# Patient Record
Sex: FEMALE | Race: OTHER
[De-identification: names, ages, dates, MRNs, and addresses within clinical notes are randomized per-mention and may not be internally consistent; named-entity substitution may affect disease eponyms.]

---

## 2021-09-09 PROBLEM — Z00.00 ENCOUNTER FOR PREVENTIVE HEALTH EXAMINATION: Status: ACTIVE | Noted: 2021-09-09

## 2021-09-10 ENCOUNTER — NON-APPOINTMENT (OUTPATIENT)
Age: 31
End: 2021-09-10

## 2021-09-24 ENCOUNTER — APPOINTMENT (OUTPATIENT)
Dept: GYNECOLOGIC ONCOLOGY | Facility: CLINIC | Age: 31
End: 2021-09-24
Payer: COMMERCIAL

## 2021-09-24 ENCOUNTER — NON-APPOINTMENT (OUTPATIENT)
Age: 31
End: 2021-09-24

## 2021-09-24 VITALS
HEIGHT: 66 IN | WEIGHT: 240 LBS | OXYGEN SATURATION: 98 % | BODY MASS INDEX: 38.57 KG/M2 | TEMPERATURE: 97.3 F | HEART RATE: 76 BPM | SYSTOLIC BLOOD PRESSURE: 129 MMHG | DIASTOLIC BLOOD PRESSURE: 90 MMHG

## 2021-09-24 DIAGNOSIS — Z01.818 ENCOUNTER FOR OTHER PREPROCEDURAL EXAMINATION: ICD-10-CM

## 2021-09-24 PROCEDURE — 99205 OFFICE O/P NEW HI 60 MIN: CPT

## 2021-09-24 NOTE — HISTORY OF PRESENT ILLNESS
[FreeTextEntry1] : Problem List\par 1. Adnexal Mass\par \par Previous Therapy \par 1. CT A/P 7/24/21\par     a) large 19.3cm pelvic mass, most likely arising from the right ovary, with fat component compatible with a dermoid. \par     b) Unremarkable noncontrast evaluation of the kidneys, ureters and urinary bladder\par 2. MRI Pelvis 9/7/21\par     a) uterus- there is no evidence of focal uterine mass or endometrial collection.\par     b) There is a large mass in the right ovary measuring 20cm. This mass is mixed solid and cystic with a 9.2cm fat containing component. The cystic component has increased when compared to the CT scan , currently measuring 12.6 x 9.8cm, previously 0.4 x 11.1cm. There are internal septations within the cystic component and irregular wall thickeneing. The possibility of an immature teratoma should be excluded. \par     c) The left ovary is normal

## 2021-09-24 NOTE — PHYSICAL EXAM
[Abnormal] : Adnexa(ae): Abnormal [Normal] : Recto-Vaginal Exam: Normal [de-identified] : mass appreciated to 6cm above the umbilicus in the RUQ [de-identified] : deviated posteriorly and to the left [de-identified] : large cystic mass arising from the righ ovary and extending to the RUQ, limited mobility, nontender, no solid nodules appreciated.

## 2021-09-24 NOTE — REVIEW OF SYSTEMS
[Negative] : Musculoskeletal [Vaginal Discharge] : no vaginal discharge [Abn Vag Bleeding] : no abnormal vaginal bleeding

## 2021-09-24 NOTE — CHIEF COMPLAINT
[FreeTextEntry1] : 32 y/o referred from Dr. Devine for further evaluation of large right adnexal mass. Patient reports pelvic pain, back pain and urinary frequency.\par \par LMP: 9/5/21\par \par OBHX: P0\par GYNHX: as above\par \par PMHX: None\par SX: ankle surgery\par \par MED: none\par ALL: NKDA\par \par SOCIAL: single, social ETOH, no other toxic habits\par FAM: No cancers

## 2021-09-24 NOTE — ASSESSMENT
[FreeTextEntry1] : I discussed with the patient with the aid of diagrams, reviewed the findings on history and physical examination, and reviewed the imaging studies in detail.  We reviewed the cystic and solid component of the mass, the CA-125, and other tumor markers. \par \par We discussed that this could be ovarian cancer; however, benign and low malignant potential tumors are also in the differential diagnosis. Other etiologies of pelvic masses, such as metastatic disease from another organ site also discussed.\par \par Again, with the aid of diagrams the different surgical approaches discussed including minimally invasive and open approaches.\par \par At this point, I recommend exploratory laparotomy and right salpingo-oophorectomy.  If there is evidence of malignancy, I recommend omentectomy, possible para-aortic nodes and pelvic node dissection. We discussed the role of fertility sparing surgery. In cases where fertility sparing surgery is performed, it is possible that a staged procedure will be necessary to come back and remove the remaining ovary and uterus. Patient understands this, and would like to proceed with fertility sparing surgery. Oncofertility consultation with KAYA discussed. Patient will consider this in the future, but is not interested in seeing anyone pre-op. \par \par Complications that include, but are not limited to: bleeding, infection, injury to other organs including bowel, bladder, ureters, blood vessels, nerves; infections, blood clots, lymphedema, pneumonia, wound complications and prolonged hospital stay have all been discussed with the patient. Whenever minimally invasive surgery is attempted, there is a chance of needing to convert to laparotomy. The risk of occult injury requiring additional surgery also discussed. I have also provided her with the diagrams.  \par \par I explained intraperitoneal chemotherapy and a peritoneal port that would be placed at the time of surgery.  She understands that this port may need to be removed if she does not qualify for intraperitoneal chemotherapy.  \par \par Surgical scheduling was discussed and instructions for optimization prior to surgery were given. will follow the Enhanced Recovery After Surgery (ERAS) protocol.  \par No aspirin or NSAID products for 1 week prior. \par She will choose a surgical date.\par \par [] Medical clearance\par [] COVID-19 test pre op\par [] Review CT images with radiology\par [] Pre-op labs today\par [] Tumor markers\par [] Exlap, RSO possible fertility sparing staging

## 2021-09-27 LAB
AFP-TM SERPL-MCNC: <1.8 NG/ML
ALBUMIN SERPL ELPH-MCNC: 5 G/DL
ALP BLD-CCNC: 87 U/L
ALT SERPL-CCNC: 14 U/L
ANION GAP SERPL CALC-SCNC: 17 MMOL/L
APTT BLD: 36.3 SEC
AST SERPL-CCNC: 17 U/L
BASOPHILS # BLD AUTO: 0.03 K/UL
BASOPHILS NFR BLD AUTO: 0.3 %
BILIRUB SERPL-MCNC: 0.4 MG/DL
BUN SERPL-MCNC: 13 MG/DL
CALCIUM SERPL-MCNC: 10 MG/DL
CANCER AG125 SERPL-ACNC: 21 U/ML
CANCER AG19-9 SERPL-ACNC: 211 U/ML
CEA SERPL-MCNC: 1.8 NG/ML
CHLORIDE SERPL-SCNC: 101 MMOL/L
CO2 SERPL-SCNC: 19 MMOL/L
CREAT SERPL-MCNC: 0.64 MG/DL
EOSINOPHIL # BLD AUTO: 0.14 K/UL
EOSINOPHIL NFR BLD AUTO: 1.3 %
ESTIMATED AVERAGE GLUCOSE: 103 MG/DL
GLUCOSE SERPL-MCNC: 106 MG/DL
HBA1C MFR BLD HPLC: 5.2 %
HCG SERPL-MCNC: <1 MIU/ML
HCT VFR BLD CALC: 46.5 %
HGB BLD-MCNC: 14.6 G/DL
IMM GRANULOCYTES NFR BLD AUTO: 0.5 %
INR PPP: 1.18 RATIO
LDH SERPL-CCNC: 145 U/L
LYMPHOCYTES # BLD AUTO: 2.12 K/UL
LYMPHOCYTES NFR BLD AUTO: 19.8 %
MAN DIFF?: NORMAL
MCHC RBC-ENTMCNC: 27.9 PG
MCHC RBC-ENTMCNC: 31.4 GM/DL
MCV RBC AUTO: 88.7 FL
MONOCYTES # BLD AUTO: 0.49 K/UL
MONOCYTES NFR BLD AUTO: 4.6 %
NEUTROPHILS # BLD AUTO: 7.89 K/UL
NEUTROPHILS NFR BLD AUTO: 73.5 %
PLATELET # BLD AUTO: 337 K/UL
POTASSIUM SERPL-SCNC: 4.4 MMOL/L
PROT SERPL-MCNC: 8.2 G/DL
PT BLD: 13.9 SEC
RBC # BLD: 5.24 M/UL
RBC # FLD: 13.9 %
SODIUM SERPL-SCNC: 138 MMOL/L
WBC # FLD AUTO: 10.72 K/UL

## 2021-09-29 LAB — INHIBIN B: 16.4 PG/ML

## 2021-10-01 LAB — INHIBIN A SERPL-MCNC: 9.4 PG/ML

## 2021-10-12 ENCOUNTER — TRANSCRIPTION ENCOUNTER (OUTPATIENT)
Age: 31
End: 2021-10-12

## 2021-10-25 ENCOUNTER — LABORATORY RESULT (OUTPATIENT)
Age: 31
End: 2021-10-25

## 2021-10-27 ENCOUNTER — TRANSCRIPTION ENCOUNTER (OUTPATIENT)
Age: 31
End: 2021-10-27

## 2021-10-27 VITALS
TEMPERATURE: 98 F | SYSTOLIC BLOOD PRESSURE: 137 MMHG | HEIGHT: 66 IN | RESPIRATION RATE: 16 BRPM | OXYGEN SATURATION: 98 % | DIASTOLIC BLOOD PRESSURE: 89 MMHG | WEIGHT: 236.12 LBS | HEART RATE: 81 BPM

## 2021-10-27 RX ORDER — HEPARIN SODIUM 5000 [USP'U]/ML
5000 INJECTION INTRAVENOUS; SUBCUTANEOUS ONCE
Refills: 0 | Status: COMPLETED | OUTPATIENT
Start: 2021-10-28 | End: 2021-10-28

## 2021-10-27 RX ORDER — ACETAMINOPHEN 500 MG
1000 TABLET ORAL ONCE
Refills: 0 | Status: COMPLETED | OUTPATIENT
Start: 2021-10-28 | End: 2021-10-28

## 2021-10-27 RX ORDER — GABAPENTIN 400 MG/1
600 CAPSULE ORAL ONCE
Refills: 0 | Status: COMPLETED | OUTPATIENT
Start: 2021-10-28 | End: 2021-10-28

## 2021-10-27 RX ORDER — CELECOXIB 200 MG/1
400 CAPSULE ORAL ONCE
Refills: 0 | Status: COMPLETED | OUTPATIENT
Start: 2021-10-28 | End: 2021-10-28

## 2021-10-27 NOTE — PATIENT PROFILE ADULT - NSASFALLATTEMPTOOB_GEN_A_NUR
Important Medication Changes: none    Further testing to be done:none        Next follow up visit: In office in 4 Months              HERE IS SOME IMPORTANT INFORMATION FOR OUR PATIENTS    If you need refills- call your pharmacist and they will contact our office.    If you have medical concerns after hours call [phone].    If you have a question during office hours call  and to make appointment [phone]. You will eventually speak with my nurses. If you need to speak to me directly, let them know and I will get back to you as soon as possible.  Better yet, you can consider signing up for [employer], our popular online communication portal to schedule an appointment, ask for a refill, or contact our staff. Go to:  [email or web address]    Audie Mesa MD   no

## 2021-10-28 ENCOUNTER — INPATIENT (INPATIENT)
Facility: HOSPITAL | Age: 31
LOS: 0 days | Discharge: HOME | DRG: 743 | End: 2021-10-29
Attending: OBSTETRICS & GYNECOLOGY | Admitting: OBSTETRICS & GYNECOLOGY
Payer: COMMERCIAL

## 2021-10-28 ENCOUNTER — RESULT REVIEW (OUTPATIENT)
Age: 31
End: 2021-10-28

## 2021-10-28 ENCOUNTER — APPOINTMENT (OUTPATIENT)
Dept: GYNECOLOGIC ONCOLOGY | Facility: HOSPITAL | Age: 31
End: 2021-10-28

## 2021-10-28 DIAGNOSIS — Z98.890 OTHER SPECIFIED POSTPROCEDURAL STATES: Chronic | ICD-10-CM

## 2021-10-28 LAB
BLD GP AB SCN SERPL QL: NEGATIVE — SIGNIFICANT CHANGE UP
RH IG SCN BLD-IMP: POSITIVE — SIGNIFICANT CHANGE UP

## 2021-10-28 PROCEDURE — 88307 TISSUE EXAM BY PATHOLOGIST: CPT | Mod: 26

## 2021-10-28 PROCEDURE — 88112 CYTOPATH CELL ENHANCE TECH: CPT | Mod: 26

## 2021-10-28 PROCEDURE — 88331 PATH CONSLTJ SURG 1 BLK 1SPC: CPT | Mod: 26

## 2021-10-28 PROCEDURE — 88305 TISSUE EXAM BY PATHOLOGIST: CPT | Mod: 26

## 2021-10-28 PROCEDURE — 58940 REMOVAL OF OVARY(S): CPT

## 2021-10-28 RX ORDER — SIMETHICONE 80 MG/1
80 TABLET, CHEWABLE ORAL EVERY 6 HOURS
Refills: 0 | Status: DISCONTINUED | OUTPATIENT
Start: 2021-10-28 | End: 2021-10-28

## 2021-10-28 RX ORDER — ACETAMINOPHEN 500 MG
1000 TABLET ORAL EVERY 6 HOURS
Refills: 0 | Status: DISCONTINUED | OUTPATIENT
Start: 2021-10-28 | End: 2021-10-29

## 2021-10-28 RX ORDER — HYDROMORPHONE HYDROCHLORIDE 2 MG/ML
0.5 INJECTION INTRAMUSCULAR; INTRAVENOUS; SUBCUTANEOUS
Refills: 0 | Status: DISCONTINUED | OUTPATIENT
Start: 2021-10-28 | End: 2021-10-29

## 2021-10-28 RX ORDER — OXYCODONE HYDROCHLORIDE 5 MG/1
5 TABLET ORAL
Refills: 0 | Status: DISCONTINUED | OUTPATIENT
Start: 2021-10-28 | End: 2021-10-29

## 2021-10-28 RX ORDER — KETOROLAC TROMETHAMINE 30 MG/ML
30 SYRINGE (ML) INJECTION EVERY 8 HOURS
Refills: 0 | Status: DISCONTINUED | OUTPATIENT
Start: 2021-10-28 | End: 2021-10-29

## 2021-10-28 RX ORDER — SODIUM CHLORIDE 9 MG/ML
1000 INJECTION, SOLUTION INTRAVENOUS
Refills: 0 | Status: DISCONTINUED | OUTPATIENT
Start: 2021-10-28 | End: 2021-10-29

## 2021-10-28 RX ORDER — ONDANSETRON 8 MG/1
8 TABLET, FILM COATED ORAL EVERY 8 HOURS
Refills: 0 | Status: DISCONTINUED | OUTPATIENT
Start: 2021-10-28 | End: 2021-10-28

## 2021-10-28 RX ORDER — INFLUENZA VIRUS VACCINE 15; 15; 15; 15 UG/.5ML; UG/.5ML; UG/.5ML; UG/.5ML
0.5 SUSPENSION INTRAMUSCULAR ONCE
Refills: 0 | Status: DISCONTINUED | OUTPATIENT
Start: 2021-10-28 | End: 2021-10-29

## 2021-10-28 RX ORDER — ONDANSETRON 8 MG/1
8 TABLET, FILM COATED ORAL EVERY 8 HOURS
Refills: 0 | Status: DISCONTINUED | OUTPATIENT
Start: 2021-10-28 | End: 2021-10-29

## 2021-10-28 RX ORDER — METOCLOPRAMIDE HCL 10 MG
10 TABLET ORAL EVERY 8 HOURS
Refills: 0 | Status: DISCONTINUED | OUTPATIENT
Start: 2021-10-28 | End: 2021-10-29

## 2021-10-28 RX ORDER — BUPIVACAINE 13.3 MG/ML
20 INJECTION, SUSPENSION, LIPOSOMAL INFILTRATION ONCE
Refills: 0 | Status: DISCONTINUED | OUTPATIENT
Start: 2021-10-28 | End: 2021-10-29

## 2021-10-28 RX ORDER — SIMETHICONE 80 MG/1
80 TABLET, CHEWABLE ORAL EVERY 6 HOURS
Refills: 0 | Status: DISCONTINUED | OUTPATIENT
Start: 2021-10-28 | End: 2021-10-29

## 2021-10-28 RX ADMIN — Medication 1000 MILLIGRAM(S): at 18:27

## 2021-10-28 RX ADMIN — SIMETHICONE 80 MILLIGRAM(S): 80 TABLET, CHEWABLE ORAL at 17:47

## 2021-10-28 RX ADMIN — Medication 1000 MILLIGRAM(S): at 17:46

## 2021-10-28 RX ADMIN — GABAPENTIN 600 MILLIGRAM(S): 400 CAPSULE ORAL at 11:38

## 2021-10-28 RX ADMIN — Medication 30 MILLIGRAM(S): at 22:22

## 2021-10-28 RX ADMIN — OXYCODONE HYDROCHLORIDE 5 MILLIGRAM(S): 5 TABLET ORAL at 17:30

## 2021-10-28 RX ADMIN — Medication 1000 MILLIGRAM(S): at 11:37

## 2021-10-28 RX ADMIN — CELECOXIB 400 MILLIGRAM(S): 200 CAPSULE ORAL at 11:37

## 2021-10-28 RX ADMIN — OXYCODONE HYDROCHLORIDE 5 MILLIGRAM(S): 5 TABLET ORAL at 17:09

## 2021-10-28 RX ADMIN — HEPARIN SODIUM 5000 UNIT(S): 5000 INJECTION INTRAVENOUS; SUBCUTANEOUS at 11:38

## 2021-10-28 NOTE — H&P ADULT - ASSESSMENT
32 yo G0 presenting for exploratory laparotomy with right salpingoophorectomy for suspected dermoid cyst    LR @ 125 cc/hr  ADAT  SCDs 30 yo G0 presenting for exploratory laparotomy with right salpingoophorectomy for suspected dermoid cyst    1. Neuro/Pain: tylenol/toradol atc, oxy 5/10 prn  2  CV:   VS per routine  3. Pulm: Encourage ISS  4. GI: ADAT  5. :  Newton in place, TOV in AM  6. Heme: AM labs   7. ID: --  8. DVT ppx: SCDs, lovenox POD 1 await CBC  9. Dispo: after meeting post-op milestones

## 2021-10-28 NOTE — PACU DISCHARGE NOTE - COMMENTS
Patient met PACU criteria, mid abdominal surgical incision with dermabond intact, no c/o discomfort upon transfer, report endorsed to 9Jh RN Ehsan

## 2021-10-28 NOTE — BRIEF OPERATIVE NOTE - OPERATION/FINDINGS
Entry via midline vertical incision, mobile 15 cm complex cystic/solid mass seen upon entry to abdominal cavity. Mass/ovary able to be delivered through incision and removed with LigaSure device. Frozen benign pathology. LEft ovary and fallopian tube appeared grossly normal. Uterus appeared normal. Closure in standard fashion

## 2021-10-28 NOTE — H&P ADULT - HISTORY OF PRESENT ILLNESS
31y G0 presenting for exploratory laparotomy with right salpingoophorectomy for suspected dermoid cyst. Cyst was found incidentally on CT imaging after urology workup for hematuria. Notes occasional R sided pelvic pain which she attributed to her history of R ankle surgery (that it affected her hip mobility).    Denies fever, chills, chest pain, palpitations, SOB, n/v.    OB H/x: G0    GYN H/x: denies STIs, abnormal pap smears, fibroids    MED H/x: none    SURG H/x: R ankle repair 2007 (2 plates and 16 screws)    Medications: multivitamin  Allergies: NKDA  FamHx: no history gyn, colon, breast cancer  Social: social drinker, no smoking or recreational drug use    Vital Signs Last 24 Hrs  T(C): --  T(F): --  HR: --  BP: --  BP(mean): --  RR: --  SpO2: --    Physical Exam:  Gen: NAD, comfortable  GI: obese, soft, nontender, nondistended + BS, no rebound, no guarding  Ext: no edema, erythema, tenderness

## 2021-10-29 ENCOUNTER — TRANSCRIPTION ENCOUNTER (OUTPATIENT)
Age: 31
End: 2021-10-29

## 2021-10-29 VITALS
DIASTOLIC BLOOD PRESSURE: 90 MMHG | TEMPERATURE: 98 F | HEART RATE: 66 BPM | SYSTOLIC BLOOD PRESSURE: 148 MMHG | RESPIRATION RATE: 17 BRPM | OXYGEN SATURATION: 98 %

## 2021-10-29 LAB
ANION GAP SERPL CALC-SCNC: 9 MMOL/L — SIGNIFICANT CHANGE UP (ref 5–17)
BUN SERPL-MCNC: 9 MG/DL — SIGNIFICANT CHANGE UP (ref 7–23)
CALCIUM SERPL-MCNC: 8.6 MG/DL — SIGNIFICANT CHANGE UP (ref 8.4–10.5)
CHLORIDE SERPL-SCNC: 104 MMOL/L — SIGNIFICANT CHANGE UP (ref 96–108)
CO2 SERPL-SCNC: 25 MMOL/L — SIGNIFICANT CHANGE UP (ref 22–31)
COVID-19 SPIKE DOMAIN AB INTERP: POSITIVE
COVID-19 SPIKE DOMAIN ANTIBODY RESULT: 57.2 U/ML — HIGH
CREAT SERPL-MCNC: 0.67 MG/DL — SIGNIFICANT CHANGE UP (ref 0.5–1.3)
GLUCOSE SERPL-MCNC: 102 MG/DL — HIGH (ref 70–99)
HCT VFR BLD CALC: 37.3 % — SIGNIFICANT CHANGE UP (ref 34.5–45)
HGB BLD-MCNC: 12.1 G/DL — SIGNIFICANT CHANGE UP (ref 11.5–15.5)
MAGNESIUM SERPL-MCNC: 1.8 MG/DL — SIGNIFICANT CHANGE UP (ref 1.6–2.6)
MCHC RBC-ENTMCNC: 27.9 PG — SIGNIFICANT CHANGE UP (ref 27–34)
MCHC RBC-ENTMCNC: 32.4 GM/DL — SIGNIFICANT CHANGE UP (ref 32–36)
MCV RBC AUTO: 86.1 FL — SIGNIFICANT CHANGE UP (ref 80–100)
NRBC # BLD: 0 /100 WBCS — SIGNIFICANT CHANGE UP (ref 0–0)
PHOSPHATE SERPL-MCNC: 3.5 MG/DL — SIGNIFICANT CHANGE UP (ref 2.5–4.5)
PLATELET # BLD AUTO: 273 K/UL — SIGNIFICANT CHANGE UP (ref 150–400)
POTASSIUM SERPL-MCNC: 3.6 MMOL/L — SIGNIFICANT CHANGE UP (ref 3.5–5.3)
POTASSIUM SERPL-SCNC: 3.6 MMOL/L — SIGNIFICANT CHANGE UP (ref 3.5–5.3)
RBC # BLD: 4.33 M/UL — SIGNIFICANT CHANGE UP (ref 3.8–5.2)
RBC # FLD: 13.1 % — SIGNIFICANT CHANGE UP (ref 10.3–14.5)
SARS-COV-2 IGG+IGM SERPL QL IA: 57.2 U/ML — HIGH
SARS-COV-2 IGG+IGM SERPL QL IA: POSITIVE
SODIUM SERPL-SCNC: 138 MMOL/L — SIGNIFICANT CHANGE UP (ref 135–145)
WBC # BLD: 11.66 K/UL — HIGH (ref 3.8–10.5)
WBC # FLD AUTO: 11.66 K/UL — HIGH (ref 3.8–10.5)

## 2021-10-29 RX ORDER — ACETAMINOPHEN 500 MG
2 TABLET ORAL
Qty: 0 | Refills: 0 | DISCHARGE
Start: 2021-10-29

## 2021-10-29 RX ORDER — OXYCODONE HYDROCHLORIDE 5 MG/1
1 TABLET ORAL
Qty: 8 | Refills: 0
Start: 2021-10-29

## 2021-10-29 RX ORDER — SODIUM CHLORIDE 9 MG/ML
3 INJECTION INTRAMUSCULAR; INTRAVENOUS; SUBCUTANEOUS EVERY 8 HOURS
Refills: 0 | Status: DISCONTINUED | OUTPATIENT
Start: 2021-10-29 | End: 2021-10-29

## 2021-10-29 RX ORDER — ENOXAPARIN SODIUM 100 MG/ML
40 INJECTION SUBCUTANEOUS EVERY 24 HOURS
Refills: 0 | Status: DISCONTINUED | OUTPATIENT
Start: 2021-10-29 | End: 2021-10-29

## 2021-10-29 RX ADMIN — Medication 1000 MILLIGRAM(S): at 06:03

## 2021-10-29 RX ADMIN — Medication 1000 MILLIGRAM(S): at 00:01

## 2021-10-29 RX ADMIN — SIMETHICONE 80 MILLIGRAM(S): 80 TABLET, CHEWABLE ORAL at 00:02

## 2021-10-29 RX ADMIN — SIMETHICONE 80 MILLIGRAM(S): 80 TABLET, CHEWABLE ORAL at 11:30

## 2021-10-29 RX ADMIN — ENOXAPARIN SODIUM 40 MILLIGRAM(S): 100 INJECTION SUBCUTANEOUS at 11:29

## 2021-10-29 RX ADMIN — Medication 1000 MILLIGRAM(S): at 11:33

## 2021-10-29 RX ADMIN — Medication 30 MILLIGRAM(S): at 06:03

## 2021-10-29 RX ADMIN — Medication 1000 MILLIGRAM(S): at 11:31

## 2021-10-29 RX ADMIN — SODIUM CHLORIDE 3 MILLILITER(S): 9 INJECTION INTRAMUSCULAR; INTRAVENOUS; SUBCUTANEOUS at 13:30

## 2021-10-29 RX ADMIN — SIMETHICONE 80 MILLIGRAM(S): 80 TABLET, CHEWABLE ORAL at 06:03

## 2021-10-29 NOTE — DISCHARGE NOTE PROVIDER - CARE PROVIDER_API CALL
Liz Forrest)  Obstetrics and Gynecology  [address], [address], [place] [address]  Phone: [phone]  Fax: [phone]  Follow Up Time: 2 weeks

## 2021-10-29 NOTE — DISCHARGE NOTE NURSING/CASE MANAGEMENT/SOCIAL WORK - PATIENT PORTAL LINK FT
You can access the FollowMyHealth Patient Portal offered by [facility] by registering at the following website: [email or web address]. By joining [email or web address]’s FollowMyHealth portal, you will also be able to view your health information using other applications (apps) compatible with our system.

## 2021-10-29 NOTE — DISCHARGE NOTE PROVIDER - NSDCMRMEDTOKEN_GEN_ALL_CORE_FT
acetaminophen 500 mg oral tablet: 2 tab(s) orally every 6 hours, As needed, Mild Pain (1 - 3)  acetaminophen 500 mg oral tablet: 2 tab(s) orally every 6 hours

## 2021-10-29 NOTE — PROGRESS NOTE ADULT - SUBJECTIVE AND OBJECTIVE BOX
GYN Post Op Check     Patient seen at bedside.  Pain controlled. Tolerating sips. Not OOB yet. Newton in place.    Denies CP, SOB, fever, chills, nausea, vomiting.    Vital Signs Last 24 Hrs  T(C): 37.2 (28 Oct 2021 19:47), Max: 37.2 (28 Oct 2021 19:47)  T(F): 99 (28 Oct 2021 19:47), Max: 99 (28 Oct 2021 19:47)  HR: 70 (28 Oct 2021 19:47) (67 - 84)  BP: 150/90 (28 Oct 2021 19:47) (122/79 - 150/92)  BP(mean): 102 (28 Oct 2021 19:00) (95 - 114)  RR: 18 (28 Oct 2021 19:47) (15 - 26)  SpO2: 95% (28 Oct 2021 19:47) (94% - 99%)    Physical Exam:  Gen: Well-appearing. NAD.  Resp: Breathing comfortably on RA. Lungs CTAB.  Abd: Soft, non-tender, mildly distended, decreased BS, no rebound, no guarding. Dressing C/D/I.  : Newton to gravity draining clear urine.  Ext: SCDs in place. No calf tenderness.    I&O's Summary    28 Oct 2021 07:01  -  28 Oct 2021 20:03  --------------------------------------------------------  IN: 500 mL / OUT: 140 mL / NET: 360 mL      MEDICATIONS  (STANDING):  acetaminophen     Tablet .. 1000 milliGRAM(s) Oral every 6 hours  BUpivacaine liposome 1.3% Injectable (no eMAR) 20 milliLiter(s) Local Injection once  influenza   Vaccine 0.5 milliLiter(s) IntraMuscular once  ketorolac   Injectable 30 milliGRAM(s) IV Push every 8 hours  lactated ringers. 1000 milliLiter(s) (125 mL/Hr) IV Continuous <Continuous>  simethicone 80 milliGRAM(s) Chew every 6 hours    MEDICATIONS  (PRN):  acetaminophen     Tablet .. 1000 milliGRAM(s) Oral every 6 hours PRN Mild Pain (1 - 3)  HYDROmorphone  Injectable 0.5 milliGRAM(s) IV Push every 15 minutes PRN Severe Pain (7 - 10)  metoclopramide Injectable 10 milliGRAM(s) IV Push every 8 hours PRN second line nausea  ondansetron Injectable 8 milliGRAM(s) IV Push every 8 hours PRN Nausea and/or Vomiting  oxyCODONE    IR 5 milliGRAM(s) Oral every 3 hours PRN Moderate Pain (4 - 6)    Allergies    No Known Allergies    Intolerances        LABS:                  
GYN PROGRESS NOTE    Patient evaluated at the bedside. No acute events overnight.     Denies CP/SOB/dizziness/nausea/vomiting/abdominal pain/calf pain.    Pain well controlled on oral pain medications. Patient is ambulating independently, not yet passing flatus. Passed her TOV this morning     O:   T(C): 36.8 (10-29-21 @ 05:24), Max: 37.3 (10-29-21 @ 00:08)  HR: 63 (10-29-21 @ 05:24) (63 - 84)  BP: 147/90 (10-29-21 @ 05:24) (122/79 - 150/92)  RR: 18 (10-29-21 @ 05:24) (15 - 26)  SpO2: 96% (10-29-21 @ 05:24) (94% - 99%)  Wt(kg): --    GEN: patient appears well  LUNGS: no respiratory distress  ABD: soft, non tender, non distended, +BS, no rebound, no guarding, incisions C/D/I  Pelvic: no VB   EXT: no calf tenderness        10-28 @ 07:01  -  10-29 @ 07:00  --------------------------------------------------------  IN: 500 mL / OUT: 1195 mL / NET: -695 mL    MEDICATIONS  (STANDING):  acetaminophen     Tablet .. 1000 milliGRAM(s) Oral every 6 hours  BUpivacaine liposome 1.3% Injectable (no eMAR) 20 milliLiter(s) Local Injection once  influenza   Vaccine 0.5 milliLiter(s) IntraMuscular once  ketorolac   Injectable 30 milliGRAM(s) IV Push every 8 hours  lactated ringers. 1000 milliLiter(s) (125 mL/Hr) IV Continuous <Continuous>  simethicone 80 milliGRAM(s) Chew every 6 hours    MEDICATIONS  (PRN):  acetaminophen     Tablet .. 1000 milliGRAM(s) Oral every 6 hours PRN Mild Pain (1 - 3)  HYDROmorphone  Injectable 0.5 milliGRAM(s) IV Push every 15 minutes PRN Severe Pain (7 - 10)  metoclopramide Injectable 10 milliGRAM(s) IV Push every 8 hours PRN second line nausea  ondansetron Injectable 8 milliGRAM(s) IV Push every 8 hours PRN Nausea and/or Vomiting  oxyCODONE    IR 5 milliGRAM(s) Oral every 3 hours PRN Moderate Pain (4 - 6)

## 2021-10-29 NOTE — PROGRESS NOTE ADULT - ASSESSMENT
31y y.o. F POD#1 s/p ex-lap, RSO admitted for pain control and postoperative monitoring.    Plan:  Neuro: Tylenol/toradol ATC, oxycodone PRN severe pain  CV: VSS.  Resp: On RA. Encourage ISS  GI/FEN: Low fiber diet as tolerated. Zofran/Reglan PRN nausea. Simethicone PRN gas pain.  : voiding   ID: Afebrile  DVT ppx: SCDs, lovenox on POD 1    
31y y.o. F POD#0 s/p ex-lap, RSO admitted for pain control and postoperative monitoring.    Plan:  Neuro: Tylenol/toradol ATC, oxycodone PRN severe pain  CV: VSS.  Resp: On RA. Encourage ISS  GI/FEN: Low fiber diet as tolerated. Zofran/Reglan PRN nausea. Simethicone PRN gas pain.  : Newton in place  ID: Afebrile  DVT ppx: SCDs, ambulate as tolerated      Екатерина Rubio MD PGY2

## 2021-10-29 NOTE — DISCHARGE NOTE PROVIDER - HOSPITAL COURSE
32 yo s/p ex lap with RSO (frozen benign pathology).  Procedure overall went well. Pt had minimal EBL of 25 cc and post op Hgb stable at 12.1.  Pt admitted post operatively for pain control and monitoring.  Pt passing all post operative milestones and is hemodynamically stable for discharge.  Pt will f/u with Dr Forrest outpatient.

## 2021-10-29 NOTE — PROGRESS NOTE ADULT - REASON FOR ADMISSION
exploratory laparotomy with right salpingoophorectomy for suspected dermoid cyst
exploratory laparotomy with right salpingoophorectomy for suspected dermoid cyst

## 2021-11-02 LAB — NON-GYNECOLOGICAL CYTOLOGY STUDY: SIGNIFICANT CHANGE UP

## 2021-11-04 ENCOUNTER — APPOINTMENT (OUTPATIENT)
Dept: GYNECOLOGIC ONCOLOGY | Facility: CLINIC | Age: 31
End: 2021-11-04
Payer: COMMERCIAL

## 2021-11-04 VITALS
WEIGHT: 231 LBS | HEIGHT: 66 IN | HEART RATE: 91 BPM | TEMPERATURE: 98.2 F | SYSTOLIC BLOOD PRESSURE: 127 MMHG | OXYGEN SATURATION: 97 % | BODY MASS INDEX: 37.12 KG/M2 | RESPIRATION RATE: 18 BRPM | DIASTOLIC BLOOD PRESSURE: 82 MMHG

## 2021-11-04 DIAGNOSIS — N94.89 OTHER SPECIFIED CONDITIONS ASSOCIATED WITH FEMALE GENITAL ORGANS AND MENSTRUAL CYCLE: ICD-10-CM

## 2021-11-04 PROCEDURE — 99024 POSTOP FOLLOW-UP VISIT: CPT

## 2021-11-04 NOTE — HISTORY OF PRESENT ILLNESS
[FreeTextEntry1] : Problem List\par 1. Adnexal Mass\par \par Previous Therapy \par 1. CT A/P 7/24/21\par     a) large 19.3cm pelvic mass, most likely arising from the right ovary, with fat component compatible with a dermoid. \par     b) Unremarkable noncontrast evaluation of the kidneys, ureters and urinary bladder\par 2. MRI Pelvis 9/7/21\par     a) uterus- there is no evidence of focal uterine mass or endometrial collection.\par     b) There is a large mass in the right ovary measuring 20cm. This mass is mixed solid and cystic with a 9.2cm fat containing component. The cystic component has increased when compared to the CT scan , currently measuring 12.6 x 9.8cm, previously 0.4 x 11.1cm. There are internal septations within the cystic component and irregular wall thickeneing. The possibility of an immature teratoma should be excluded. \par     c) The left ovary is normal\par 3. S/P Exploratory laparotomy , Right salpingo-oophorectomy 10/28/21- PATH PENDING

## 2021-11-04 NOTE — ASSESSMENT
[FreeTextEntry1] : - Path pending\par - Patient healing well\par - Postoperative precautions reinforced\par - Repeat CA 19-9 at next visit \par

## 2021-11-04 NOTE — REASON FOR VISIT
[FreeTextEntry1] : Pt presents for 1 week post op. Repeat CA 19-9 at next visit. Pt is feeling well, no complaints. Pt reports good appetite and normal BMs.

## 2021-11-04 NOTE — PHYSICAL EXAM
[Normal] : No focal neurologic defects observed [Restricted in physically strenuous activity but ambulatory and able to carry out work of a light or sedentary nature] : Status 1- Restricted in physically strenuous activity but ambulatory and able to carry out work of a light or sedentary nature, e.g., light house work, office work [de-identified] : vertical incision c/d/i. Abdomen soft, NT, ND

## 2021-11-04 NOTE — CHIEF COMPLAINT
[FreeTextEntry1] : 30 y/o referred from Dr. Devine for further evaluation of large right adnexal mass. Patient reports pelvic pain, back pain and urinary frequency.\par \par LMP: 9/5/21\par \par OBHX: P0\par GYNHX: as above\par \par PMHX: None\par SX: ankle surgery\par \par MED: none\par ALL: NKDA\par \par SOCIAL: single, social ETOH, no other toxic habits\par FAM: No cancers

## 2021-11-08 LAB — SURGICAL PATHOLOGY STUDY: SIGNIFICANT CHANGE UP

## 2021-11-10 DIAGNOSIS — D27.0 BENIGN NEOPLASM OF RIGHT OVARY: ICD-10-CM

## 2021-11-15 PROCEDURE — 83735 ASSAY OF MAGNESIUM: CPT

## 2021-11-15 PROCEDURE — 84100 ASSAY OF PHOSPHORUS: CPT

## 2021-11-15 PROCEDURE — C9399: CPT

## 2021-11-15 PROCEDURE — 86901 BLOOD TYPING SEROLOGIC RH(D): CPT

## 2021-11-15 PROCEDURE — 86900 BLOOD TYPING SEROLOGIC ABO: CPT

## 2021-11-15 PROCEDURE — 88305 TISSUE EXAM BY PATHOLOGIST: CPT

## 2021-11-15 PROCEDURE — 88112 CYTOPATH CELL ENHANCE TECH: CPT

## 2021-11-15 PROCEDURE — 86769 SARS-COV-2 COVID-19 ANTIBODY: CPT

## 2021-11-15 PROCEDURE — 36415 COLL VENOUS BLD VENIPUNCTURE: CPT

## 2021-11-15 PROCEDURE — 86850 RBC ANTIBODY SCREEN: CPT

## 2021-11-15 PROCEDURE — 85027 COMPLETE CBC AUTOMATED: CPT

## 2021-11-15 PROCEDURE — 80048 BASIC METABOLIC PNL TOTAL CA: CPT

## 2021-11-15 PROCEDURE — 88331 PATH CONSLTJ SURG 1 BLK 1SPC: CPT

## 2021-11-15 PROCEDURE — 88307 TISSUE EXAM BY PATHOLOGIST: CPT

## 2021-12-01 ENCOUNTER — APPOINTMENT (OUTPATIENT)
Dept: GYNECOLOGIC ONCOLOGY | Facility: CLINIC | Age: 31
End: 2021-12-01
Payer: COMMERCIAL

## 2021-12-01 VITALS
SYSTOLIC BLOOD PRESSURE: 122 MMHG | OXYGEN SATURATION: 96 % | BODY MASS INDEX: 38.25 KG/M2 | TEMPERATURE: 97.7 F | HEART RATE: 100 BPM | WEIGHT: 238 LBS | HEIGHT: 66 IN | RESPIRATION RATE: 18 BRPM | DIASTOLIC BLOOD PRESSURE: 78 MMHG

## 2021-12-01 DIAGNOSIS — D36.9 BENIGN NEOPLASM, UNSPECIFIED SITE: ICD-10-CM

## 2021-12-01 PROCEDURE — 99024 POSTOP FOLLOW-UP VISIT: CPT

## 2021-12-01 NOTE — HISTORY OF PRESENT ILLNESS
[FreeTextEntry1] : Problem List\par 1. Adnexal Mass\par \par Previous Therapy \par 1. CT A/P 7/24/21\par     a) large 19.3cm pelvic mass, most likely arising from the right ovary, with fat component compatible with a dermoid. \par     b) Unremarkable noncontrast evaluation of the kidneys, ureters and urinary bladder\par 2. MRI Pelvis 9/7/21\par     a) uterus- there is no evidence of focal uterine mass or endometrial collection.\par     b) There is a large mass in the right ovary measuring 20cm. This mass is mixed solid and cystic with a 9.2cm fat containing component. The cystic component has increased when compared to the CT scan , currently measuring 12.6 x 9.8cm, previously 0.4 x 11.1cm. There are internal septations within the cystic component and irregular wall thickeneing. The possibility of an immature teratoma should be excluded. \par     c) The left ovary is normal\par 3. 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

## 2021-12-01 NOTE — ASSESSMENT
[FreeTextEntry1] : Appropriate 1 month recovery\par \par Okay to resume all normal activities.\par \par Importance of routine gyn care emphasized. Reconsult as needed.\par \par [] Repeat CA 19-9

## 2021-12-01 NOTE — PHYSICAL EXAM
[Restricted in physically strenuous activity but ambulatory and able to carry out work of a light or sedentary nature] : Status 1- Restricted in physically strenuous activity but ambulatory and able to carry out work of a light or sedentary nature, e.g., light house work, office work [Normal] : Recto-Vaginal Exam: Normal [de-identified] : vertical incision c/d/i

## 2021-12-01 NOTE — REASON FOR VISIT
[FreeTextEntry1] : Pt presents for 1 month post op. Repeat CA 19-9 needed today. Pt is doing well, no complaints. She has resumed normal menses. Reports normal urination and BMs. No residual pain.

## 2021-12-02 LAB — CANCER AG19-9 SERPL-ACNC: 35 U/ML

## 2022-04-11 PROBLEM — D36.9 DERMOID CYST: Status: ACTIVE | Noted: 2021-12-01

## 2025-02-11 NOTE — ASU PREOP CHECKLIST - BLOOD AVAILABLE
Over the shift, the patient did not make progress toward the following goals. Barriers to progression include . Recommendations to address these barriers include .      Problem: Safety - Adult  Goal: Free from fall injury  2/11/2025 0604 by Edgar Morel RN  Outcome: Progressing  2/11/2025 0052 by Edgar Morel RN  Outcome: Progressing  2/11/2025 0050 by Edgar Morel RN  Outcome: Progressing     Problem: Discharge Planning  Goal: Discharge to home or other facility with appropriate resources  2/11/2025 0604 by Edgar Morel RN  Outcome: Progressing  2/11/2025 0052 by Edgar Morel RN  Outcome: Progressing  2/11/2025 0050 by Edgar Morel RN  Outcome: Progressing     Problem: Chronic Conditions and Co-morbidities  Goal: Patient's chronic conditions and co-morbidity symptoms are monitored and maintained or improved  2/11/2025 0604 by Edgar Morel RN  Outcome: Progressing  2/11/2025 0052 by Edgar Morel RN  Outcome: Progressing  2/11/2025 0050 by Edgar Morel RN  Outcome: Progressing     Problem: Nutrition  Goal: Nutrient intake appropriate for maintaining nutritional needs  2/11/2025 0604 by Edgar Morel RN  Outcome: Progressing  2/11/2025 0052 by Edgar Morel RN  Outcome: Progressing  2/11/2025 0050 by Edgar Morel RN  Outcome: Progressing     Problem: Fall/Injury  Goal: Not fall by end of shift  2/11/2025 0604 by Edgar Morel RN  Outcome: Progressing  2/11/2025 0052 by Edgar Morel RN  Outcome: Progressing  2/11/2025 0050 by Edgar Morel RN  Outcome: Progressing     Problem: Fall/Injury  Goal: Be free from injury by end of the shift  2/11/2025 0604 by Edgar Morel RN  Outcome: Progressing  2/11/2025 0052 by Edgar Morel RN  Outcome: Progressing  2/11/2025 0050 by Edgar Morel RN  Outcome: Progressing     Problem: Fall/Injury  Goal: Verbalize understanding of personal risk factors for fall in the  hospital  2/11/2025 0604 by Edgar Morel RN  Outcome: Progressing  2/11/2025 0052 by Edgar Morel RN  Outcome: Progressing  2/11/2025 0050 by Edgar Morel RN  Outcome: Progressing     Problem: Fall/Injury  Goal: Verbalize understanding of risk factor reduction measures to prevent injury from fall in the home  2/11/2025 0604 by Edgar Morel RN  Outcome: Progressing  2/11/2025 0052 by Edgar Morel RN  Outcome: Progressing  2/11/2025 0050 by Edgar Morel RN  Outcome: Progressing     Problem: Fall/Injury  Goal: Use assistive devices by end of the shift  2/11/2025 0604 by Edgar Morel RN  Outcome: Progressing  2/11/2025 0052 by Edgar Morel RN  Outcome: Progressing  2/11/2025 0050 by Edgar Morel RN  Outcome: Progressing     Problem: Fall/Injury  Goal: Pace activities to prevent fatigue by end of the shift  2/11/2025 0604 by Edgar Morel RN  Outcome: Progressing  2/11/2025 0052 by Edgar Morel RN  Outcome: Progressing  2/11/2025 0050 by Edgar Morel RN  Outcome: Progressing     Problem: Pain  Goal: Walks with improved pain control throughout the shift  2/11/2025 0604 by Edgar Morel RN  Outcome: Progressing     Problem: Pain  Goal: Performs ADL's with improved pain control throughout shift  2/11/2025 0604 by Edgar Morel RN  Outcome: Progressing     Problem: Pain  Goal: Participates in PT with improved pain control throughout the shift  2/11/2025 0604 by Edgar Morel RN  Outcome: Progressing  2/11/2025 0052 by Edgar Morel RN  Outcome: Progressing  2/11/2025 0050 by Edgar Morel RN  Outcome: Progressing     Problem: Skin  Goal: Decreased wound size/increased tissue granulation at next dressing change  2/11/2025 0604 by Edgar Morel RN  Outcome: Progressing  2/11/2025 0052 by Edgar Morel RN  Outcome: Progressing  2/11/2025 0051 by Edgar Morel RN  Flowsheets (Taken 2/11/2025  0051)  Decreased wound size/increased tissue granulation at next dressing change:   Promote sleep for wound healing   Protective dressings over bony prominences   Utilize specialty bed per algorithm     Problem: Skin  Goal: Participates in plan/prevention/treatment measures  2/11/2025 0604 by Edgar Morel RN  Outcome: Progressing  2/11/2025 0052 by Edgar Morel RN  Outcome: Progressing  2/11/2025 0051 by Edgar Morel RN  Flowsheets (Taken 2/11/2025 0051)  Participates in plan/prevention/treatment measures:   Increase activity/out of bed for meals   Elevate heels     Problem: Skin  Goal: Prevent/manage excess moisture  2/11/2025 0604 by Edgar Morel RN  Outcome: Progressing  2/11/2025 0052 by Edgar Morel RN  Outcome: Progressing  2/11/2025 0051 by Edgar Morel RN  Flowsheets (Taken 2/11/2025 0051)  Prevent/manage excess moisture:   Cleanse incontinence/protect with barrier cream   Monitor for/manage infection if present   Moisturize dry skin   Follow provider orders for dressing changes   Use wicking fabric (obtain order)     Problem: Skin  Goal: Prevent/minimize sheer/friction injuries  2/11/2025 0604 by Edgar Morel RN  Outcome: Progressing  2/11/2025 0052 by Edgar Morel RN  Outcome: Progressing  2/11/2025 0051 by Edgar Morel RN  Flowsheets (Taken 2/11/2025 0051)  Prevent/minimize sheer/friction injuries:   Use pull sheet   HOB 30 degrees or less   Turn/reposition every 2 hours/use positioning/transfer devices   Utilize specialty bed per algorithm     Problem: Skin  Goal: Promote/optimize nutrition  2/11/2025 0604 by Edgar Morel RN  Outcome: Progressing  2/11/2025 0052 by Edgar Morel RN  Outcome: Progressing  2/11/2025 0051 by Edgar Morel RN  Flowsheets (Taken 2/11/2025 0051)  Promote/optimize nutrition: Offer water/supplements/favorite foods     Problem: Skin  Goal: Promote skin healing  2/11/2025 0604 by Edgar Morel  RN  Outcome: Progressing  2/11/2025 0052 by Edgar Morel RN  Outcome: Progressing  2/11/2025 0051 by Edgar Morel RN  Flowsheets (Taken 2/11/2025 0051)  Promote skin healing:   Assess skin/pad under line(s)/device(s)   Protective dressings over bony prominences        t&s